# Patient Record
Sex: FEMALE | Race: WHITE | NOT HISPANIC OR LATINO | Employment: FULL TIME | URBAN - METROPOLITAN AREA
[De-identification: names, ages, dates, MRNs, and addresses within clinical notes are randomized per-mention and may not be internally consistent; named-entity substitution may affect disease eponyms.]

---

## 2017-03-13 ENCOUNTER — GENERIC CONVERSION - ENCOUNTER (OUTPATIENT)
Dept: OTHER | Facility: OTHER | Age: 32
End: 2017-03-13

## 2017-03-13 ENCOUNTER — APPOINTMENT (OUTPATIENT)
Dept: URGENT CARE | Age: 32
End: 2017-03-13
Payer: COMMERCIAL

## 2017-03-13 PROCEDURE — G0382 LEV 3 HOSP TYPE B ED VISIT: HCPCS

## 2018-01-13 VITALS
WEIGHT: 293 LBS | OXYGEN SATURATION: 97 % | RESPIRATION RATE: 16 BRPM | HEIGHT: 66 IN | SYSTOLIC BLOOD PRESSURE: 135 MMHG | HEART RATE: 80 BPM | BODY MASS INDEX: 47.09 KG/M2 | TEMPERATURE: 97.4 F | DIASTOLIC BLOOD PRESSURE: 84 MMHG

## 2018-09-12 ENCOUNTER — OFFICE VISIT (OUTPATIENT)
Dept: URGENT CARE | Age: 33
End: 2018-09-12
Payer: COMMERCIAL

## 2018-09-12 VITALS
SYSTOLIC BLOOD PRESSURE: 137 MMHG | HEART RATE: 91 BPM | TEMPERATURE: 97.4 F | BODY MASS INDEX: 47.09 KG/M2 | HEIGHT: 66 IN | RESPIRATION RATE: 18 BRPM | DIASTOLIC BLOOD PRESSURE: 81 MMHG | OXYGEN SATURATION: 97 % | WEIGHT: 293 LBS

## 2018-09-12 DIAGNOSIS — R22.0 JAW SWELLING: ICD-10-CM

## 2018-09-12 DIAGNOSIS — K04.7 DENTAL INFECTION: Primary | ICD-10-CM

## 2018-09-12 PROCEDURE — G0382 LEV 3 HOSP TYPE B ED VISIT: HCPCS | Performed by: NURSE PRACTITIONER

## 2018-09-12 RX ORDER — CLINDAMYCIN HYDROCHLORIDE 300 MG/1
300 CAPSULE ORAL 3 TIMES DAILY
Qty: 21 CAPSULE | Refills: 0 | Status: SHIPPED | OUTPATIENT
Start: 2018-09-12 | End: 2018-09-19

## 2018-09-12 NOTE — LETTER
September 12, 2018     Patient: Payam Wynn   YOB: 1985   Date of Visit: 9/12/2018       To Whom It May Concern: It is my medical opinion that Serena Willard may return to work on 09/13/2018  If you have any questions or concerns, please don't hesitate to call           Sincerely,          [facility]    CC: No Recipients

## 2018-09-12 NOTE — PATIENT INSTRUCTIONS
There does appear to be posterior molar with redness an black exudate  Will start treatment with antibiotic  Take probiotic  Salt water gargles  Ice to area 3 times per day  Follow up dentist   Follow up with PCP if no improvement in swelling  Go to ER with worsening symptoms, fever, body aches or chills

## 2018-09-12 NOTE — PROGRESS NOTES
[facility]        NAME: Roxana Dubois is a 28 y o  female  : 1985    MRN: [id number]  DATE: 2018  TIME: 9:18 AM    Assessment and Plan   Dental infection [K04 7]  1  Dental infection  clindamycin (CLEOCIN) 300 MG capsule   2  Jaw swelling           Patient Instructions     Patient Instructions   There does appear to be posterior molar with redness an black exudate  Will start treatment with antibiotic  Take probiotic  Salt water gargles  Ice to area 3 times per day  Follow up dentist   Follow up with PCP if no improvement in swelling  Go to ER with worsening symptoms, fever, body aches or chills  Chief Complaint     Chief Complaint   Patient presents with    Facial Swelling     c/o sinus headache , reports of missing tooth, fell out, denies dental pain  History of Present Illness   Roxana Dubois presents to the clinic c/o    This is a 28year old female here today with complaints of lower jaw swelling  She states that on  into Monday she had a sinus headache around the left side of her face  She states this resolved  She then noticed some lower jaw swelling  She denies fever, body aches or chills  She does have missing tooth in back of mouth  She denies pain at that site but does have some discomfort along her lower jaw  There is swelling  She states she is up to date on childhood vaccines  Review of Systems   Review of Systems   Constitutional: Negative  HENT: Negative  Gastrointestinal: Negative  Genitourinary: Negative  Musculoskeletal: Negative  Neurological: Positive for headaches  Current Medications     No long-term prescriptions on file         Current Allergies     Allergies as of 2018 - Reviewed 2018   Allergen Reaction Noted    Penicillins Rash 2014            The following portions of the patient's history were reviewed and updated as appropriate: allergies, current medications, past family history, past medical history, past social history, past surgical history and problem list     Objective   /81   Pulse 91   Temp (!) 97 4 °F (36 3 °C) (Temporal)   Resp 18   Ht 5' 6" (1 676 m)   Wt 136 kg (300 lb)   LMP 09/05/2018   SpO2 97%   BMI 48 42 kg/m²        Physical Exam     Physical Exam   Constitutional: She is oriented to person, place, and time  She appears well-developed and well-nourished  HENT:   Head:       Mouth/Throat:       Neck: Normal range of motion  Neck supple  Pulmonary/Chest: Effort normal and breath sounds normal  No respiratory distress  She has no wheezes  Neurological: She is alert and oriented to person, place, and time  Skin: Skin is warm and dry  Psychiatric: She has a normal mood and affect  Her behavior is normal    Nursing note and vitals reviewed

## 2018-12-05 ENCOUNTER — OFFICE VISIT (OUTPATIENT)
Dept: URGENT CARE | Age: 33
End: 2018-12-05
Payer: COMMERCIAL

## 2018-12-05 VITALS
WEIGHT: 293 LBS | OXYGEN SATURATION: 98 % | BODY MASS INDEX: 47.09 KG/M2 | HEART RATE: 99 BPM | TEMPERATURE: 97.6 F | DIASTOLIC BLOOD PRESSURE: 75 MMHG | RESPIRATION RATE: 20 BRPM | HEIGHT: 66 IN | SYSTOLIC BLOOD PRESSURE: 149 MMHG

## 2018-12-05 DIAGNOSIS — R19.7 DIARRHEA OF PRESUMED INFECTIOUS ORIGIN: Primary | ICD-10-CM

## 2018-12-05 PROCEDURE — G0383 LEV 4 HOSP TYPE B ED VISIT: HCPCS | Performed by: FAMILY MEDICINE

## 2018-12-05 RX ORDER — AZITHROMYCIN 500 MG/1
500 TABLET, FILM COATED ORAL DAILY
Qty: 3 TABLET | Refills: 0 | Status: SHIPPED | OUTPATIENT
Start: 2018-12-05 | End: 2018-12-08

## 2018-12-05 NOTE — PROGRESS NOTES
[facility]    NAME: Sandra Joshua is a 28 y o  female  : 1985    MRN: [id number]  DATE: 2018  TIME: 6:14 PM    Assessment and Plan   Diarrhea of presumed infectious origin [R19 7]  1  Diarrhea of presumed infectious origin  azithromycin (ZITHROMAX) 500 MG tablet     Hand written order given for stool testing to include C and S, O and P, fecal leukocytes, C difficile and Hemoccult  Patient Instructions     Patient Instructions   Collect stool test and turn in to [facility] lab as soon as possible  Start antibiotic after collecting stool samples  Take Imodium as needed  Push fluids  Call PCP office in morning to arrange follow up evaluation  If weakening and or vomiting and unable to keep food / liquid down, proceed to ER for further evaluation  Chief Complaint     Chief Complaint   Patient presents with    Abdominal Pain     cramps ,mucus color  yellow and bloatedness  in the stomach and can not eat well  patient states she is getting multiple symptom started on 2018 it getting  worse past 2 weeks     Diarrhea    Nausea       History of Present Illness   Sandra Joshua presents to the clinic c/o  69-year-old female with persistent diarrhea since   She was visiting family in [place] over the  holiday and developed the diarrhea before   She return to the area the day after  and thought she was doing a little bit better  By the next day the diarrhea started worsening  She is stooling maybe up to 10 times per day  She reports that she has to get up at night and passes runny stools which is not normal for her  Typical stooling is once a day in the morning  She notices a yellow discoloration with mucus  She has some bloating and increased gas with nausea and periodic abdominal pain with cramping  No obvious blood in diarrhea  No foul order  No recent antibiotic use      She did try Pepto-Bismol and Imodium on 2 separate occasions without much relief  She reports that she has been limiting her dietary intake especially during her work day to try and avoid increased stooling  She reports that she is hungry but hesitates excessive eating at this time  No foreign travel  She is a    No contact with animals other than her cats who are healthy  No known exposures  She has not contacted her primary care provider as of yet  Review of Systems   Review of Systems   Constitutional: Positive for activity change, appetite change, chills and fatigue  Negative for fever  Respiratory: Negative  Cardiovascular: Negative  Gastrointestinal: Positive for abdominal distention, abdominal pain, diarrhea and nausea  Negative for blood in stool, constipation and rectal pain  Neurological: Negative  Hematological: Negative  Current Medications     No long-term prescriptions on file  Current Allergies     Allergies as of 12/05/2018 - Reviewed 12/05/2018   Allergen Reaction Noted    Penicillins Rash 11/05/2014          The following portions of the patient's history were reviewed and updated as appropriate: allergies, current medications, past family history, past medical history, past social history, past surgical history and problem list   History reviewed  No pertinent past medical history  Past Surgical History:   Procedure Laterality Date    BACK SURGERY       History reviewed  No pertinent family history  Objective   /75   Pulse 99   Temp 97 6 °F (36 4 °C) (Temporal)   Resp 20   Ht 5' 6" (1 676 m)   Wt 134 kg (296 lb)   LMP 11/18/2018 (Approximate)   SpO2 98%   BMI 47 78 kg/m²        Physical Exam     Physical Exam   Constitutional: She is oriented to person, place, and time  She appears well-developed and well-nourished  No distress     Appears mildly fatigued   HENT:   Mouth/Throat: Oropharynx is clear and moist    Eyes: Conjunctivae are normal  No scleral icterus  Cardiovascular: Normal rate, regular rhythm and normal heart sounds  Exam reveals no gallop and no friction rub  No murmur heard  Pulmonary/Chest: Effort normal and breath sounds normal  No respiratory distress  She has no wheezes  She has no rales  Abdominal: Soft  She exhibits distension and mass  She exhibits no fluid wave and no ascites  Bowel sounds are increased  There is no hepatosplenomegaly  There is no tenderness  There is no rigidity, no guarding, no tenderness at McBurney's point and negative Arguello's sign  Lymphadenopathy:     She has no cervical adenopathy  Neurological: She is alert and oriented to person, place, and time  Skin: Skin is warm and dry  No rash noted  She is not diaphoretic  Good turgor   Psychiatric: She has a normal mood and affect  Nursing note and vitals reviewed

## 2018-12-05 NOTE — PATIENT INSTRUCTIONS
Collect stool test and turn in to [facility] as soon as possible  Start antibiotic after collecting stool samples  Take Imodium as needed  Push fluids  Call PCP office in morning to arrange follow up evaluation  If weakening and or vomiting and unable to keep food / liquid down, proceed to ER for further evaluation

## 2018-12-11 ENCOUNTER — TELEPHONE (OUTPATIENT)
Dept: URGENT CARE | Age: 33
End: 2018-12-11

## 2018-12-11 ENCOUNTER — TRANSCRIBE ORDERS (OUTPATIENT)
Dept: LAB | Age: 33
End: 2018-12-11

## 2018-12-11 NOTE — TELEPHONE ENCOUNTER
Call to patient to inquire about test results and if patient turned in her stool testing samples as no results are in chart  Left my phone number for her to return call

## 2020-11-04 ENCOUNTER — OFFICE VISIT (OUTPATIENT)
Dept: URGENT CARE | Age: 35
End: 2020-11-04
Payer: COMMERCIAL

## 2020-11-04 VITALS — SYSTOLIC BLOOD PRESSURE: 120 MMHG | HEART RATE: 84 BPM | DIASTOLIC BLOOD PRESSURE: 80 MMHG

## 2020-11-04 DIAGNOSIS — R59.0 CERVICAL ADENOPATHY: Primary | ICD-10-CM

## 2020-11-04 PROCEDURE — G0382 LEV 3 HOSP TYPE B ED VISIT: HCPCS | Performed by: PHYSICIAN ASSISTANT
